# Patient Record
(demographics unavailable — no encounter records)

---

## 2024-11-04 NOTE — HISTORY OF PRESENT ILLNESS
[de-identified] : 71 yr old F w a h/o of prediabetes, osteoporosis, presents today for annual exam   Xanax - patient travels and needs Xanax for flying - anxiety with flying  Endoscopy  - needed cardiac work up   Xifaxan as a preventative medication for travel  needs xanax for travel has at least 6 flights   albuterol - hasn't needed to use it

## 2024-11-04 NOTE — ASSESSMENT
[FreeTextEntry1] : patient with appropriate preventative UTD  no concerns on exam  age-appropriate counseling given.   prediabetes - monitor a1c  - stable as of recent endo visit  osteoporosis - on avista - nml vitamin d and reg weighted exercise by patient  cramps - unclear etiology  may be secondary to vitamin deficiency or h/o Reynaud's  Asthma - monitor  - no recent ER visits  Thyroid Nodule - endocrine is following   Medications sent to pharmacy for travel gave script for xanax for flight and xifaxin for preventative travel

## 2024-11-04 NOTE — HEALTH RISK ASSESSMENT
[Very Good] : ~his/her~  mood as very good [No] : In the past 12 months have you used drugs other than those required for medical reasons? No [No falls in past year] : Patient reported no falls in the past year [0] : 2) Feeling down, depressed, or hopeless: Not at all (0) [PHQ-2 Negative - No further assessment needed] : PHQ-2 Negative - No further assessment needed [Never] : Never [NO] : No [Patient reported mammogram was normal] : Patient reported mammogram was normal [Patient reported bone density results were normal] : Patient reported bone density results were normal [Patient reported colonoscopy was normal] : Patient reported colonoscopy was normal [HIV test declined] : HIV test declined [Hepatitis C test declined] : Hepatitis C test declined [With Family] : lives with family [Employed] : employed [] :  [Fully functional (bathing, dressing, toileting, transferring, walking, feeding)] : Fully functional (bathing, dressing, toileting, transferring, walking, feeding) [Fully functional (using the telephone, shopping, preparing meals, housekeeping, doing laundry, using] : Fully functional and needs no help or supervision to perform IADLs (using the telephone, shopping, preparing meals, housekeeping, doing laundry, using transportation, managing medications and managing finances) [Reviewed no changes] : Reviewed, no changes [Designated Healthcare Proxy] : Designated healthcare proxy [Relationship: ___] : Relationship: [unfilled] [de-identified] : everyday does exercises  [de-identified] : monitors diet closely  [[id number]] : 0 [Change in mental status noted] : No change in mental status noted [MammogramDate] : 9/2024 [BoneDensityDate] : 9/2023 [BoneDensityComments] : on avista [ColonoscopyDate] : 6/2020 [ColonoscopyComments] : 5 yrs  [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